# Patient Record
(demographics unavailable — no encounter records)

---

## 2020-11-11 NOTE — REP
INDICATION:

PAIN IN RIGHT ANKLE AND JOINTS OF RIGHT FOOT.



COMPARISON:

None.



TECHNIQUE:

Four views.



FINDINGS:

Four views of the right ankle demonstrate intact ankle mortise.  There is mild soft

tissue swelling about the lateral malleolus.  No fracture or subluxation is seen.



IMPRESSION:

Mild soft tissue swelling.  No fracture noted





<Electronically signed by Freeman Rosenbaum > 11/11/20 [id number]

## 2020-11-11 NOTE — REP
INDICATION:

PAIN IN RIGHT ANKLE AND JOINTS OF RIGHT FOOT.  Inversion injury.



COMPARISON:

None.



TECHNIQUE:

Four views.



FINDINGS:

Four views of the right foot demonstrate mild hallux valgus.  Bones, joints and soft

tissues are otherwise unremarkable..  No fracture or subluxation is seen.  No opaque

foreign body noted.





IMPRESSION:

Negative right foot series.







<Electronically signed by Freeman Rosenbaum > 11/11/20 [id number]

## 2021-02-12 NOTE — CCD
Summarization Of Episode

                             Created on: 2021



AFSHANPATSY COATS

External Reference #: [id number]

: 1979

Sex: Female



Demographics





                          Address                   [address] [place]  [address]

 

                          Home Phone                [phone]

 

                          Preferred Language        English

 

                          Marital Status            Single

 

                          Yazidi Affiliation     NONE

 

                          Race                      White

 

                          Ethnic Group              Not  or 





Author





                          Author                    [facility]

 

                          Organization              [facility]

 

                          Address                   Unknown

 

                          Phone                     Unavailable







Support





                Name            Relationship    Address         Phone

 

                    FUCCILLO*           Next Of Kin         [address]

[place]  [address]                        [phone]

 

                    [facility] [place] [employer] Next Of Kin         [address]

[place]  [address]                    [phone]

 

                CONTACT,  NO    Next Of Kin     Unknown         [phone]

 

                    [employer]        Next Of Kin         -

[place]  [address]                   [phone]

 

                    IN HOME CARE SENIOR Next Of Kin         -

[place]  [address]                    -

 

                UE              Next Of Kin     Unknown         Unavailable

 

                    SAMREEN GALLO      Next Of Kin         [address] [place]  [address]                       [phone]

 

                    [employer]        Next Of Kin         [address], [place]  [address] [phone]

 

                    DIPESH BULLARD    Next Of Kin         [address] [place], [place]  [address] [phone]

 

                    NOEMI Bullard   ECON                [address] [place]  [address]                     [phone] 







Care Team Providers





                    Care Team Member Name Role                Phone

 

                    FRANCIS Markham PA Unavailable         Unavailable

 

                    Scordo, M Liberty PA Unavailable         Unavailable

 

                    Scordo, M Liberty PA Unavailable         Unavailable

 

                    Scordo, M Liberty PA Unavailable         Unavailable

 

                    Scordo, M Liberty PA Unavailable         Unavailable

 

                    Scordo, M Liberty PA Unavailable         Unavailable

 

                    Scordo, M Liberty PA Unavailable         Unavailable

 

                    Scordo, M Liberty PA Unavailable         Unavailable

 

                    Scordo, M Liberty PA Unavailable         Unavailable

 

                    Scordo, M Liberty PA Unavailable         Unavailable

 

                    Scordo, M Liberty PA Unavailable         Unavailable

 

                    Scordo, M Liberty PA Unavailable         Unavailable

 

                    Scordo, M Liberty PA Unavailable         Unavailable

 

                    Scordo, M Liberty PA Unavailable         Unavailable

 

                    Scordo, M Liberty PA Unavailable         Unavailable

 

                    Scordo, M Liberty PA Unavailable         Unavailable

 

                    Scordo, M Liberty PA Unavailable         Unavailable

 

                    Scordo, M Liberty PA Unavailable         Unavailable

 

                    Scordo, M Liberty PA Unavailable         Unavailable

 

                    Scordo, M Liberty PA Unavailable         Unavailable

 

                    Scordo, M Liberty PA Unavailable         Unavailable

 

                    Scordo, M Liberty PA Unavailable         Unavailable

 

                    Scordo, M Liberty PA Unavailable         Unavailable

 

                    Scordo, M Liberty PA Unavailable         Unavailable

 

                    Scordo, M Liberty PA Unavailable         Unavailable

 

                    Scordo, M Liberty PA Unavailable         Unavailable

 

                    Scordo, M Liberty PA Unavailable         Unavailable

 

                    Scordo, M Liberty PA Unavailable         Unavailable

 

                    Scordo, M Liberty PA Unavailable         Unavailable

 

                    Scordo, M Liberty PA Unavailable         Unavailable

 

                    Scordo, M Liberty PA Unavailable         Unavailable

 

                    Scordo, M Liberty PA Unavailable         Unavailable

 

                    Scordo, M Liberty PA Unavailable         Unavailable

 

                    Scordo, M Liberty PA Unavailable         Unavailable

 

                    Scordo, M Liberty PA Unavailable         Unavailable

 

                    Scordo, M Liberty PA Unavailable         Unavailable

 

                    Scordo, M Liberty PA Unavailable         Unavailable

 

                    Scordo, M Liberty PA Unavailable         Unavailable

 

                    Scordo, M Liberty PA Unavailable         Unavailable

 

                    Scordo, M Liberty PA Unavailable         Unavailable

 

                    Scordo, M Liberty PA Unavailable         Unavailable

 

                    Scordo, M Liberty PA Unavailable         Unavailable

 

                    RING, K XENIA PA  Unavailable         Unavailable

 

                    RING, K XENIA PA  Unavailable         Unavailable

 

                    RING, K XENIA PA  Unavailable         Unavailable

 

                    RING, K XENIA PA  Unavailable         Unavailable

 

                    RING, K XENIA PA  Unavailable         Unavailable

 

                    RING, K XENIA PA  Unavailable         Unavailable

 

                    RING, K XENIA PA  Unavailable         Unavailable

 

                    RING, K XENIA PA  Unavailable         Unavailable

 

                    RING, K XENIA PA  Unavailable         Unavailable

 

                    RING, K XENIA PA  Unavailable         Unavailable

 

                    RING, K XENIA PA  Unavailable         Unavailable

 

                    RING, K XENIA PA  Unavailable         Unavailable

 

                    RING, K XENIA PA  Unavailable         Unavailable

 

                    RING, K XENIA PA  Unavailable         Unavailable

 

                    RING, K XENIA PA  Unavailable         Unavailable

 

                    RING, K XENIA PA  Unavailable         Unavailable

 

                    RING, K XENIA PA  Unavailable         Unavailable

 

                    RING, K XENIA PA  Unavailable         Unavailable

 

                    RING, K XENIA PA  Unavailable         Unavailable

 

                    RING, K XENIA PA  Unavailable         Unavailable

 

                    RING, K XENIA PA  Unavailable         Unavailable

 

                    Aguillon,  Crystal NP Unavailable         Unavailable

 

                    Aguillon,  Crystal NP Unavailable         Unavailable

 

                    Aguillon,  Crystal NP Unavailable         Unavailable

 

                    Aguillon,  Crystal NP Unavailable         Unavailable

 

                    Aguillon,  Crystal NP Unavailable         Unavailable

 

                    Aguillon,  Crystal NP Unavailable         Unavailable

 

                    Aguillon,  Crystal NP Unavailable         Unavailable

 

                    Aguillon,  Crystal NP Unavailable         Unavailable

 

                    Aguillon,  Rcystal NP Unavailable         Unavailable

 

                    Aguillon,  Crystal NP Unavailable         Unavailable

 

                    Aguillon,  Crystal NP Unavailable         Unavailable



                                  



Re-disclosure Warning

          The records that you are about to access may contain information from 
federally-assisted alcohol or drug abuse programs. If such information is 
present, then the following federally mandated warning applies: This information
has been disclosed to you from records protected by federal confidentiality 
rules (42 CFR part 2). The federal rules prohibit you from making any further 
disclosure of this information unless further disclosure is expressly permitted 
by the written consent of the person to whom it pertains or as otherwise 
permitted by 42 CFR part 2. A general authorization for the release of medical 
or other information is NOT sufficient for this purpose. The Federal rules 
restrict any use of the information to criminally investigate or prosecute any 
alcohol or drug abuse patient.The records that you are about to access may 
contain highly sensitive health information, the redisclosure of which is 
protected by Article 27-F of the [facility] Public Health law. If you 
continue you may have access to information: Regarding HIV / AIDS; Provided by 
facilities licensed or operated by the [facility] Office of Mental Health; 
or Provided by the [facility] Office for People With Developmental 
Disabilities. If such information is present, then the following [place] 
mandated warning applies: This information has been disclosed to you from 
confidential records which are protected by state law. State law prohibits you 
from making any further disclosure of this information without the specific 
written consent of the person to whom it pertains, or as otherwise permitted by 
law. Any unauthorized further disclosure in violation of state law may result in
a fine or residential sentence or both. A general authorization for the release of 
medical or other information is NOT sufficient authorization for further disc
losure.                                                                         
    



Family History

          



             Family Member Name Family Member Gender Family Member Status Date o

f Status 

Description                             Data Source(s)

 

           Unknown    Male       Problem                          [facility] ([facility]

 

           Unknown    Unknown    Problem                          [facility] ([facility]

 

                                        bone mgm 

 

           Unknown    Unknown    Problem                          [facility] ([facility]

 

           Unknown    Female                                       



                                                                                
                                     



Encounters

          



           Encounter  Providers  Location   Date       Indications Data Source(s

)

 

                Outpatient      Attender: Crystal espinal 10/28/2020 

10:00:00 AM EDT                                     [facility] ([facility]

e, [facility])

 

                Outpatient      Attender: XENIA [facility] 

2020 12:15:00 

PM EDT                                              [facility] ([facility]

e, [facility])

 

           Outpatient Attender: Liberty [facility] Main Office 2020 08:30:00

 AM EDT            

[facility] ([facility]

 

           Outpatient Attender: [facility] Main Office 2020 10:45:00

 AM EST            

[facility] ([facility]



                                                                                
                                     



Medications

          



          Medication Brand Name Start Date Product Form Dose      Route     Admi

nistrative 

Instructions Pharmacy Instructions Status     Indications Reaction   Description

 Data 

Source(s)

 

          20 mg               2021 12:00:00 AM EST tablet    90           

       TAKE ONE TABLET BY MOUTH THREE 

TIMES A DAY * MAXIMUM DAILY DOSE = 3    TAKE ONE TABLET BY MOUTH THREE TIMES A D

AY 

* MAXIMUM DAILY DOSE = 3 SOLD: 2021                                       

 [facility]

 

          20 mg               12/10/2020 12:00:00 AM EST tablet    90           

       TAKE ONE TABLET BY MOUTH THREE 

TIMES A DAY MAXIMUM DAILY DOSE = 3      TAKE ONE TABLET BY MOUTH THREE TIMES A D

AY 

MAXIMUM DAILY DOSE = 3 SOLD: 12/10/2020                                        K

[facility]

 

          20 mg               11/10/2020 12:00:00 AM EST tablet    90           

       TAKE ONE TABLET BY MOUTH THREE 

TIMES A DAY MAXIMUM DAILY DOSE = 3      TAKE ONE TABLET BY MOUTH THREE TIMES A D

AY 

MAXIMUM DAILY DOSE = 3 SOLD: 11/10/2020                                        K

[facility]

 

          20 mg               10/10/2020 12:00:00 AM EDT tablet    90           

       TAKE ONE TABLET BY MOUTH THREE 

TIMES A DAY MAXIMUM DAILY DOSE = 3      TAKE ONE TABLET BY MOUTH THREE TIMES A D

AY 

MAXIMUM DAILY DOSE = 3 SOLD: 10/10/2020                                        K

[facility]

 

          20 mg               2020 12:00:00 AM EDT tablet    90           

       TAKE ONE TABLET BY MOUTH THREE 

TIMES A DAY MAXIMUM DAILY DOSE = 3      TAKE ONE TABLET BY MOUTH THREE TIMES A D

AY 

MAXIMUM DAILY DOSE = 3 SOLD: 2020                                        K

[facility]

 

          20 mg               2020 12:00:00 AM EDT tablet    90           

       TAKE ONE TABLET BY MOUTH THREE 

TIMES A DAY MAXIMUM DAILY DOSE = 3      TAKE ONE TABLET BY MOUTH THREE TIMES A D

AY 

MAXIMUM DAILY DOSE = 3 SOLD: 2020                                        K

[facility]

 

                          Acetaminophen 325 MG / Hydrocodone Bitartrate 5 MG Ora

l Tablet 

Hydrocodone-Acetaminophen 2020 12:00:00 AM EDT               ORAL         

        completed               

                                        [facility] ([facility])

 

     No Active Medications      2020 12:00:00 AM EDT                      

    completed                

[facility] ([facility])

 

          20 mg               2020 12:00:00 AM EDT tablet    90           

       TAKE ONE TABLET BY MOUTH THREE 

TIMES A DAY MAXIMUM DAILY DOSE = 3      TAKE ONE TABLET BY MOUTH THREE TIMES A D

AY 

MAXIMUM DAILY DOSE = 3 SOLD: 2020                                        K

[facility]

 

          20 mg               2020 12:00:00 AM EDT tablet    90           

       TAKE ONE TABLET BY MOUTH THREE 

TIMES A DAY MAXIMUM DAILY DOSE = 3      TAKE ONE TABLET BY MOUTH THREE TIMES A D

AY 

MAXIMUM DAILY DOSE = 3 SOLD: 2020                                        K

[facility]

 

          400 mg              2020 12:00:00 AM EDT capsule   180          

       TAKE ONE CAPSULE BY MOUTH 

TWICE A DAY TAKE ONE CAPSULE BY MOUTH TWICE A DAY SOLD: 2020              

                    [facility]

 

          20 mg               2020 12:00:00 AM EDT tablet    90           

       TAKE ONE TABLET BY MOUTH THREE 

TIMES A DAY MAXIMUM DAILY DOSE = 3      TAKE ONE TABLET BY MOUTH THREE TIMES A D

AY 

MAXIMUM DAILY DOSE = 3 SOLD: 2020                                        [facility]

[facility]

 

          20 mg               2020 12:00:00 AM EDT tablet    90           

       TAKE 1 TABLET BY MOUTH THREE 

TIMES A DAY MAXIMUM DAILY DOSE = 3      TAKE 1 TABLET BY MOUTH THREE TIMES A DAY

 

MAXIMUM DAILY DOSE = 3 SOLD: 2020                                        [facility]

[facility]

 

          400 mg              2020 12:00:00 AM EDT capsule   60           

       TAKE 1 CAPSULE BY MOUTH ONCE 

DAILY      TAKE 1 CAPSULE BY MOUTH ONCE DAILY SOLD: 2020                  

                [facility]

 

          400 mg              2020 12:00:00 AM EDT capsule   60           

       TAKE ONE CAPSULE BY MOUTH TWICE

A DAY      TAKE ONE CAPSULE BY MOUTH TWICE A DAY SOLD: 2020               

                   [facility]

 

          20 mg               2020 12:00:00 AM EDT tablet    90           

       TAKE ONE TABLET BY MOUTH THREE 

TIMES A DAY MAXIMUM DAILY DOSE = 3      TAKE ONE TABLET BY MOUTH THREE TIMES A D

AY 

MAXIMUM DAILY DOSE = 3 SOLD: 2020                                        [facility]

[facility]

 

        gabapentin 600 MG Oral Tablet Gabapentin 2020 12:00:00 AM EST     

            ORAL             

             active                                              [facility] ([facility]

 

                                        Amphetamine aspartate 5 MG / Amphetamine

 Sulfate 5 MG / Dextroamphetamine 

saccharate 5 MG / Dextroamphetamine Sulfate 5 MG Oral Tablet 

Amphetamine-Dextroamphetamine 2020 12:00:00 AM EST                        

       active                   

[facility] ([facility]

 

          7.5-325 mg           2020 12:00:00 AM EST tablet    120         

        TAKE ONE TABLET BY MOUTH 

EVERY 6 HOURS AS NEEDED FOR PAIN, MAXIMUM DAILY DOSE = 4 TAKE ONE TABLET BY 

MOUTH EVERY 6 HOURS AS NEEDED FOR PAIN, MAXIMUM DAILY DOSE = 4 SOLD: 2020 

         

                                                            [facility]

 

          600 mg              2020 12:00:00 AM EST tablet    60           

       TAKE ONE TABLET BY MOUTH TWICE A

DAY        TAKE ONE TABLET BY MOUTH TWICE A DAY SOLD: 2020                

                  [facility]

 

          20 mg               2020 12:00:00 AM EST tablet    90           

       TAKE 1 TABLET BY MOUTH THREE 

TIMES A DAY MAXIMUM DAILY DOSE = 3      TAKE 1 TABLET BY MOUTH THREE TIMES A DAY

 

MAXIMUM DAILY DOSE = 3 SOLD: 2020                                        [facility]

[facility]

 

          7.5-325 mg           2020 12:00:00 AM EST tablet    120         

        TAKE ONE TABLET BY MOUTH 

EVERY 6 HOURS AS NEEDED FOR PAIN MAXIMUM DAILY DOSE = 4 TAKE ONE TABLET BY MOUTH

EVERY 6 HOURS AS NEEDED FOR PAIN MAXIMUM DAILY DOSE = 4 SOLD: 2020        

                                

[facility]

 

          20 mg               2019 12:00:00 AM EST tablet    90           

       TAKE ONE TABLET BY MOUTH THREE 

TIMES A DAY, MAXIMUM DAILY DOSE = 3     TAKE ONE TABLET BY MOUTH THREE TIMES A D

AY, 

MAXIMUM DAILY DOSE = 3 SOLD: 2019                                        [facility]

[facility]



                                                                                
                                                                                
                                                                                
                                             



Insurance Providers

          



             Payer name   Policy type / Coverage type Policy ID    Covered party

 ID Covered 

party's relationship to coronel Policy Coronel             Plan Information

 

          [employer] [facility] HEATHER DIV           [id number]           LP2           

      [id number]

 

          [employer]           [id number]           LP2                 [id number]

[id number]

 

          [facility] O         [id number]           S                   [id number]

[id number]

 

          [place] Plan P         [id number]           S                   [id number]

[id number]

 

          [facility] Commercial [id number]                            

   [id number]

 

          [employer] EMPIRE           [id number]           LP                  [id number]

[id number]

 

          [employer]           [id number]           LP                  [id number]

[id number]

 

          Emp/[employer] Commercial [id number]                            

   [id number]

 

          Emp/[employer] Commercial [id number]                            

   [id number]

 

          EMPIRE PLAN [facility] U         [id number]           Spouse              [id number]

[id number]

 

          Emp/[employer] Commercial [id number]                            

   [id number]

 

          EMPIRE BLUE CROSS BLUE SHIELD      -O/P           [id number]        

   [id number]                  [id number]

 

          [employer] EMPIRE           [id number]           LP                  [id number]

5

 

          [employer] Other     0                   Family Dependent Dipesh Bullard 0

 

          [place]/[employer] Commercial [id number]                         

      [id number]

 

          [employer] EMPIRE HEATHER DIV           [id number]           LP2           

      [id number]

 

          [employer] [place] Commercial [id number]                         

      [id number]

 

          [employer]           [id number]           LP2                 [id number]

[id number]

 

          [employer] COMMUNITY PLAN MCDHMO           [id number]           SP           

       [id number]

 

             [employer] [place] Health Maintenance Organization (HMO) [id number]

[id number]                 

Sponsored Dependent                                 [id number]

 

          [place] Commercial [id number]                         

      [id number]

 

          [place] Commercial [id number]                         

      [id number]

 

          [facility]       I         [id number]           Self                [id number]

 

          [employer](MCAID) O         [id number]           S              

     [id number]

 

          [employer] COMMUNITY PLAN MCDHMO           [id number]           SP           

       [id number]

 

          HC COMMUNITY PLAN MCDHMO           [id number]           SP           

       [id number]

 

          [employer] COMMUNITY PLAN MCDHMO           [id number]           SP           

       [id number]

 

          [facility] MEDICAID           [id number]           [place]                 [id number]

[id number]

 

          [facility]       I         [id number]           Self                [id number]

 

          [facility] Community Plan Health Maintenance Organization (HMO)              

       Self                 

 

          [employer] Hmo Commercial                     Self             

    

 

          [employer](MCAID) P         [id number]           S              

     [id number]

 

          HMO BLUE            [id number]           SP                  [id number]

[id number]

 

          MEDICAID            [id number]            SP                  [id number]

 

          [employer]           [id number]           SP                  [id number]

[id number]

 

          EMPIRE (STATE EMP) P         [id number]           S                   [id number]

[id number]

 

          EMPIRE (STATE EMP) P         UNAVAILABLE           S                  

 UNAVAILABLE

 

          [employer] P         [id number]           S                   [id number]

[id number]

 

                              [id number]                                [id number]



                                                                                
                                                                                
                                                                                
                                                                                
                                                                                
                                                                         



Surgeries/Procedures

          



             Procedure    Description  Date         Indications  Data Source(s)

 

             INCISION & DRAINAGE ABSCESS SIMPLE/SINGLE              2020 1

2:00:00 AM EDT              [facility] 

([facility])



                                                                                
       



Social History

          



           Code       Duration   Value      Status     Description Data Source(s

)

 

           Smoking    10/28/2020 12:00:00 AM EDT Quit       completed  Quit     

  [facility] ([facility])



                                                                                
                 



Vital Signs

          



                    ID                  Date                Data Source

 

                    UNK                                      









           Name       Value      Range      Interpretation Code Description Data

 Source(s)

 

           Body mass index (BMI) [Ratio] 27.5 kg/m2                       27.5 k

g/m2 [facility] ([facility], [facility])

 

           Body height 64 [in_i]                        64 [in_i]  [facility] ([facility], [facility])

 

                                        5'4" 

 

           Body weight 160.00 [lb_av]                       160.00 [lb_av] MEDEN

T ([facility], 

[facility])

 

           Body temperature 97.6 [degF]                       97.6 [degF] [facility]

 ([facility], 

[facility])

 

           Oxygen saturation in Arterial blood by Pulse oximetry 97 %           

                  97 %       [facility] 

([facility], [facility])

 

           Respiratory rate 18 /min                          18 /min    [facility] (

[facility], [facility])

 

           Heart rate 76 /min                          76 /min    [facility] ([facility], [facility])

 

           Diastolic blood pressure 66 mm[Hg]                        66 mm[Hg]  

[facility] ([facility], 

[facility])

 

           Systolic blood pressure 96 mm[Hg]                        96 mm[Hg]  M

ED[facility] ([facility], 

[facility])

 

           Body mass index (BMI) [Ratio] 27.5 kg/m2                       27.5 k

g/m2 [facility] ([facility], [facility])

 

           Body height 64 [in_i]                        64 [in_i]  [facility] ([facility], [facility])

 

                                        5'4" 

 

           Body weight 160.00 [lb_av]                       160.00 [lb_av] MEDEN

T ([facility], 

[facility])

 

           Body temperature 98.2 [degF]                       98.2 [degF] [facility]

 ([facility], 

[facility])

 

           Oxygen saturation in Arterial blood by Pulse oximetry 98 %           

                  98 %       [facility] 

([facility], [facility])

 

           Respiratory rate 16 /min                          16 /min    [facility] (

[facility], [facility])

 

           Heart rate 84 /min                          84 /min    [facility] ([facility], [facility])

 

           Diastolic blood pressure 73 mm[Hg]                        73 mm[Hg]  

[facility] ([facility])

 

           Systolic blood pressure 107 mm[Hg]                       107 mm[Hg] M

ED[facility] ([facility])

 

           Body mass index (BMI) [Ratio] 26.5 kg/m2                       26.5 k

g/m2 [facility] ([facility]

 

           Oxygen saturation in Arterial blood by Pulse oximetry 97 %           

                  97 %       [facility] 

([facility]

 

           Body weight 148.25 [lb_av]                       148.25 [lb_av] [facility]

T ([facility]

 

           Body height 62.75 [in_i]                       62.75 [in_i] [facility] ([facility]

 

                                        5'2.75" 

 

           Respiratory rate 14 /min                          14 /min    [facility] (

[facility]

 

           Body temperature 98.0 [degF]                       98.0 [degF] [facility]

 ([facility]

 

           Heart rate 94 /min                          94 /min    [facility] ([facility]

 

           Diastolic blood pressure 63 mm[Hg]                        63 mm[Hg]  

[facility] ([facility]

 

           Systolic blood pressure 104 mm[Hg]                       104 mm[Hg] M

ED[facility] ([facility]

## 2021-02-12 NOTE — CCD
Summarization Of Episode

                             Created on: 2021



AFSHANPATSY COATS

External Reference #: [id number]

: 1979

Sex: Female



Demographics





                          Address                   [address] [place]  [address]

 

                          Home Phone                [phone]

 

                          Preferred Language        English

 

                          Marital Status            Single

 

                          Confucianism Affiliation     NONE

 

                          Race                      White

 

                          Ethnic Group              Not  or 





Author





                          Author                    [facility]

 

                          Organization              [facility]

 

                          Address                   Unknown

 

                          Phone                     Unavailable







Support





                Name            Relationship    Address         Phone

 

                    [employer] Next Of Kin         [address]

[place]  [address]                    [phone]

 

                CONTACT,  NO    Next Of Kin     Unknown         [phone]

 

                    PRIVATE DUTY        Next Of Kin         -

[place]  [address]                   [phone]

 

                    [facility] HOME [facility]ARE SENIOR Next Of Kin         -

[place]  [address]                    -

 

                UE              Next Of Kin     Unknown         Unavailable

 

                    SAMREEN GALLO      Next Of Kin         [address] [place]  [address]                       [id number]

 

                    [employer]        Next Of Kin         [address], [place]  [address] [phone]

 

                    DIPESH BULLARD    Next Of Kin         [address] [place], [place]  [address] [phone]

 

                    NOEMI Bullard   ECON                [address] [place]  [address]                     [phone] 







Care Team Providers





                    Care Team Member Name Role                Phone

 

                    FRANICS Markham PA Unavailable         Unavailable

 

                    Scordo, M Liberty PA Unavailable         Unavailable

 

                    Scordo, M Liberty PA Unavailable         Unavailable

 

                    Scordo, M Liberty PA Unavailable         Unavailable

 

                    Scordo, M Liberty PA Unavailable         Unavailable

 

                    Scordo, M Liberty PA Unavailable         Unavailable

 

                    Scordo, M Liberty PA Unavailable         Unavailable

 

                    Scordo, M Liberty PA Unavailable         Unavailable

 

                    Scordo, M Liberty PA Unavailable         Unavailable

 

                    Scordo, M Liberty PA Unavailable         Unavailable

 

                    Scordo, M Liberty PA Unavailable         Unavailable

 

                    Scordo, M Liberty PA Unavailable         Unavailable

 

                    Scordo, M Liberty PA Unavailable         Unavailable

 

                    Scordo, M Liberty PA Unavailable         Unavailable

 

                    Scordo, M Liberty PA Unavailable         Unavailable

 

                    Scordo, M Liberty PA Unavailable         Unavailable

 

                    Scordo, M Liberty PA Unavailable         Unavailable

 

                    Scordo, M Liberty PA Unavailable         Unavailable

 

                    Scordo, M Liberty PA Unavailable         Unavailable

 

                    Scordo, M Liberty PA Unavailable         Unavailable

 

                    Scordo, M Liberty PA Unavailable         Unavailable

 

                    Scordo, M Liberty PA Unavailable         Unavailable

 

                    Scordo, M Liberty PA Unavailable         Unavailable

 

                    Scordo, M Liberty PA Unavailable         Unavailable

 

                    Scordo, M Liberty PA Unavailable         Unavailable

 

                    Scordo, M Liberty PA Unavailable         Unavailable

 

                    Scordo, M Liberty PA Unavailable         Unavailable

 

                    Scordo, M Liberty PA Unavailable         Unavailable

 

                    Scordo, M Liberty PA Unavailable         Unavailable

 

                    Scordo, M Librety PA Unavailable         Unavailable

 

                    Scordo, M Liberty PA Unavailable         Unavailable

 

                    Scordo, M Liberty PA Unavailable         Unavailable

 

                    Scordo, M Liberty PA Unavailable         Unavailable

 

                    Scordo, M Liberty PA Unavailable         Unavailable

 

                    Scordo, M Liberty PA Unavailable         Unavailable

 

                    Scordo, M Liberty PA Unavailable         Unavailable

 

                    Scordo, M Liberty PA Unavailable         Unavailable

 

                    Scordo, M Liberty PA Unavailable         Unavailable

 

                    Scordo, M Liberty PA Unavailable         Unavailable

 

                    Scordo, M Liberty PA Unavailable         Unavailable

 

                    Scordo, M Liberty PA Unavailable         Unavailable

 

                    Scordo, M Liberty PA Unavailable         Unavailable

 

                    RING, K XENIA PA  Unavailable         Unavailable

 

                    RING, K XENIA PA  Unavailable         Unavailable

 

                    RING, K XENIA PA  Unavailable         Unavailable

 

                    RING, K XENIA PA  Unavailable         Unavailable

 

                    RING, K XENIA PA  Unavailable         Unavailable

 

                    RING, K XENIA PA  Unavailable         Unavailable

 

                    RING, K XENIA PA  Unavailable         Unavailable

 

                    RING, K XENIA PA  Unavailable         Unavailable

 

                    RING, K XENIA PA  Unavailable         Unavailable

 

                    RING, K XENIA PA  Unavailable         Unavailable

 

                    RING, K XENIA PA  Unavailable         Unavailable

 

                    RING, K XENIA PA  Unavailable         Unavailable

 

                    RING, K XENIA PA  Unavailable         Unavailable

 

                    RING, K XENIA PA  Unavailable         Unavailable

 

                    RING, K XENIA PA  Unavailable         Unavailable

 

                    RING, K XENIA PA  Unavailable         Unavailable

 

                    RING, K XENIA PA  Unavailable         Unavailable

 

                    RING, K XENIA PA  Unavailable         Unavailable

 

                    RING, K XENIA PA  Unavailable         Unavailable

 

                    RING, K XENIA PA  Unavailable         Unavailable

 

                    RING, K XENIA PA  Unavailable         Unavailable

 

                    Aguillon,  Crystal NP Unavailable         Unavailable

 

                    Aguillon,  Crystal NP Unavailable         Unavailable

 

                    Aguillon,  Crystal NP Unavailable         Unavailable

 

                    Aguillon,  Crystal NP Unavailable         Unavailable

 

                    Aguillon,  Crystal NP Unavailable         Unavailable

 

                    Aguillon,  Crystal NP Unavailable         Unavailable

 

                    Aguillon,  Crystal NP Unavailable         Unavailable

 

                    Aguillon,  Crystal NP Unavailable         Unavailable

 

                    Aguillon,  Crystal NP Unavailable         Unavailable

 

                    Aguillon,  Crystal NP Unavailable         Unavailable

 

                    Aguillon,  Crystal NP Unavailable         Unavailable



                                  



Re-disclosure Warning

          The records that you are about to access may contain information from 
federally-assisted alcohol or drug abuse programs. If such information is 
present, then the following federally mandated warning applies: This information
has been disclosed to you from records protected by federal confidentiality 
rules (42 CFR part 2). The federal rules prohibit you from making any further 
disclosure of this information unless further disclosure is expressly permitted 
by the written consent of the person to whom it pertains or as otherwise 
permitted by 42 CFR part 2. A general authorization for the release of medical 
or other information is NOT sufficient for this purpose. The Federal rules 
restrict any use of the information to criminally investigate or prosecute any 
alcohol or drug abuse patient.The records that you are about to access may 
contain highly sensitive health information, the redisclosure of which is 
protected by Article 27-F of the [facility] Public Health law. If you 
continue you may have access to information: Regarding HIV / AIDS; Provided by 
facilities licensed or operated by the [facility] Office of Mental Health; 
or Provided by the [facility] Office for People With Developmental 
Disabilities. If such information is present, then the following [place] 
mandated warning applies: This information has been disclosed to you from 
confidential records which are protected by state law. State law prohibits you 
from making any further disclosure of this information without the specific 
written consent of the person to whom it pertains, or as otherwise permitted by 
law. Any unauthorized further disclosure in violation of state law may result in
a fine or intermediate sentence or both. A general authorization for the release of 
medical or other information is NOT sufficient authorization for further disc
losure.                                                                         
    



Family History

          



             Family Member Name Family Member Gender Family Member Status Date o

f Status 

Description                             Data Source(s)

 

           Unknown    Male       Problem                          [facility] ([facility]

 

           Unknown    Unknown    Problem                          [facility] ([facility]

 

                                        bone mgm 

 

           Unknown    Unknown    Problem                          [facility] ([facility] )

 

           Unknown    Female                                       



                                                                                
                                     



Encounters

          



           Encounter  Providers  Location   Date       Indications Data Source(s

)

 

                Outpatient      Attender: Crystal Cummings

teddy 10/28/2020 

10:00:00 AM EDT                                     [facility] ([facility]

 

                Outpatient      Attender: XENIA Alanis Primary 

2020 12:15:00 

PM EDT                                              [facility] ([facility]

[facility], [facility])

 

           Outpatient Attender: Liberty COMBS Main Office 2020 08:30:00

 AM EDT            

[facility] ([facility]

 

           Outpatient Attender: [facility] Main Office 2020 10:45:00

 AM EST            

[facility] ([facility]



                                                                                
                                     



Medications

          



          Medication Brand Name Start Date Product Form Dose      Route     Admi

nistrative 

Instructions Pharmacy Instructions Status     Indications Reaction   Description

 Data 

Source(s)

 

          20 mg               2021 12:00:00 AM EST tablet    90           

       TAKE ONE TABLET BY MOUTH THREE 

TIMES A DAY * MAXIMUM DAILY DOSE = 3    TAKE ONE TABLET BY MOUTH THREE TIMES A D

AY 

* MAXIMUM DAILY DOSE = 3 SOLD: 2021                                       

 [facility]

 

          20 mg               12/10/2020 12:00:00 AM EST tablet    90           

       TAKE ONE TABLET BY MOUTH THREE 

TIMES A DAY MAXIMUM DAILY DOSE = 3      TAKE ONE TABLET BY MOUTH THREE TIMES A D

AY 

MAXIMUM DAILY DOSE = 3 SOLD: 12/10/2020                                        [facility]

[facility]

 

          20 mg               11/10/2020 12:00:00 AM EST tablet    90           

       TAKE ONE TABLET BY MOUTH THREE 

TIMES A DAY MAXIMUM DAILY DOSE = 3      TAKE ONE TABLET BY MOUTH THREE TIMES A D

AY 

MAXIMUM DAILY DOSE = 3 SOLD: 11/10/2020                                        K

[facility]

 

          20 mg               10/10/2020 12:00:00 AM EDT tablet    90           

       TAKE ONE TABLET BY MOUTH THREE 

TIMES A DAY MAXIMUM DAILY DOSE = 3      TAKE ONE TABLET BY MOUTH THREE TIMES A D

AY 

MAXIMUM DAILY DOSE = 3 SOLD: 10/10/2020                                        K

[facility]

 

          20 mg               2020 12:00:00 AM EDT tablet    90           

       TAKE ONE TABLET BY MOUTH THREE 

TIMES A DAY MAXIMUM DAILY DOSE = 3      TAKE ONE TABLET BY MOUTH THREE TIMES A D

AY 

MAXIMUM DAILY DOSE = 3 SOLD: 2020                                        K

[facility]

 

          20 mg               2020 12:00:00 AM EDT tablet    90           

       TAKE ONE TABLET BY MOUTH THREE 

TIMES A DAY MAXIMUM DAILY DOSE = 3      TAKE ONE TABLET BY MOUTH THREE TIMES A D

AY 

MAXIMUM DAILY DOSE = 3 SOLD: 2020                                        K

[facility]

 

                          Acetaminophen 325 MG / Hydrocodone Bitartrate 5 MG Ora

l Tablet 

Hydrocodone-Acetaminophen 2020 12:00:00 AM EDT               ORAL         

        completed               

                                        [facility] ([facility])

 

     No Active Medications      2020 12:00:00 AM EDT                      

    completed                

[facility] ([facility])

 

          20 mg               2020 12:00:00 AM EDT tablet    90           

       TAKE ONE TABLET BY MOUTH THREE 

TIMES A DAY MAXIMUM DAILY DOSE = 3      TAKE ONE TABLET BY MOUTH THREE TIMES A D

AY 

MAXIMUM DAILY DOSE = 3 SOLD: 2020                                        K

[facility]

 

          20 mg               2020 12:00:00 AM EDT tablet    90           

       TAKE ONE TABLET BY MOUTH THREE 

TIMES A DAY MAXIMUM DAILY DOSE = 3      TAKE ONE TABLET BY MOUTH THREE TIMES A D

AY 

MAXIMUM DAILY DOSE = 3 SOLD: 2020                                        K

[facility]

 

          400 mg              2020 12:00:00 AM EDT capsule   180          

       TAKE ONE CAPSULE BY MOUTH 

TWICE A DAY TAKE ONE CAPSULE BY MOUTH TWICE A DAY SOLD: 2020              

                    [facility]

 

          20 mg               2020 12:00:00 AM EDT tablet    90           

       TAKE ONE TABLET BY MOUTH THREE 

TIMES A DAY MAXIMUM DAILY DOSE = 3      TAKE ONE TABLET BY MOUTH THREE TIMES A D

AY 

MAXIMUM DAILY DOSE = 3 SOLD: 2020                                        K

[facility]

 

          20 mg               2020 12:00:00 AM EDT tablet    90           

       TAKE 1 TABLET BY MOUTH THREE 

TIMES A DAY MAXIMUM DAILY DOSE = 3      TAKE 1 TABLET BY MOUTH THREE TIMES A DAY

 

MAXIMUM DAILY DOSE = 3 SOLD: 2020                                        [facility]

[facility]

 

          400 mg              2020 12:00:00 AM EDT capsule   60           

       TAKE 1 CAPSULE BY MOUTH ONCE 

DAILY      TAKE 1 CAPSULE BY MOUTH ONCE DAILY SOLD: 2020                  

                [facility]

 

          400 mg              2020 12:00:00 AM EDT capsule   60           

       TAKE ONE CAPSULE BY MOUTH TWICE

A DAY      TAKE ONE CAPSULE BY MOUTH TWICE A DAY SOLD: 2020               

                   [facility]

 

          20 mg               2020 12:00:00 AM EDT tablet    90           

       TAKE ONE TABLET BY MOUTH THREE 

TIMES A DAY MAXIMUM DAILY DOSE = 3      TAKE ONE TABLET BY MOUTH THREE TIMES A D

AY 

MAXIMUM DAILY DOSE = 3 SOLD: 2020                                        [facility]

[facility]

 

        gabapentin 600 MG Oral Tablet Gabapentin 2020 12:00:00 AM EST     

            ORAL             

             active                                              [facility] ([facility]

 

                                        Amphetamine aspartate 5 MG / Amphetamine

 Sulfate 5 MG / Dextroamphetamine 

saccharate 5 MG / Dextroamphetamine Sulfate 5 MG Oral Tablet 

Amphetamine-Dextroamphetamine 2020 12:00:00 AM EST                        

       active                   

[facility] ([facility]

 

          7.5-325 mg           2020 12:00:00 AM EST tablet    120         

        TAKE ONE TABLET BY MOUTH 

EVERY 6 HOURS AS NEEDED FOR PAIN, MAXIMUM DAILY DOSE = 4 TAKE ONE TABLET BY 

MOUTH EVERY 6 HOURS AS NEEDED FOR PAIN, MAXIMUM DAILY DOSE = 4 SOLD: 2020 

         

                                                            [facility]

 

          600 mg              2020 12:00:00 AM EST tablet    60           

       TAKE ONE TABLET BY MOUTH TWICE A

DAY        TAKE ONE TABLET BY MOUTH TWICE A DAY SOLD: 2020                

                  [facility]

 

          20 mg               2020 12:00:00 AM EST tablet    90           

       TAKE 1 TABLET BY MOUTH THREE 

TIMES A DAY MAXIMUM DAILY DOSE = 3      TAKE 1 TABLET BY MOUTH THREE TIMES A DAY

 

MAXIMUM DAILY DOSE = 3 SOLD: 2020                                        [facility]

[facility]

 

          7.5-325 mg           2020 12:00:00 AM EST tablet    120         

        TAKE ONE TABLET BY MOUTH 

EVERY 6 HOURS AS NEEDED FOR PAIN MAXIMUM DAILY DOSE = 4 TAKE ONE TABLET BY MOUTH

EVERY 6 HOURS AS NEEDED FOR PAIN MAXIMUM DAILY DOSE = 4 SOLD: 2020        

                                

[facility]

 

          20 mg               2019 12:00:00 AM EST tablet    90           

       TAKE ONE TABLET BY MOUTH THREE 

TIMES A DAY, MAXIMUM DAILY DOSE = 3     TAKE ONE TABLET BY MOUTH THREE TIMES A D

AY, 

MAXIMUM DAILY DOSE = 3 SOLD: 2019                                        [facility]

[facility]



                                                                                
                                                                                
                                                                                
                                             



Insurance Providers

          



             Payer name   Policy type / Coverage type Policy ID    Covered party

 ID Covered 

party's relationship to coronel Policy Coronel             Plan Information

 

          [facility]           [id number]           LP2           

      [id number]

 

          [employer]           [id number]           LP2                 [id number]

[id number]

 

          [employer] O         [id number]           S                   [id number]

[id number]

 

          [place] Plan P         [id number]           S                   [id number]

[id number]

 

          Emp/[employer] Commercial [id number]                            

   [id number]

 

          BCBS EMPIRE           [id number]           LP                  [id number]

[id number]

 

          [employer]           [id number]           LP                  [id number]

[id number]

 

          Emp/[employer] Commercial [id number]                            

   [id number]

 

          Emp/[employer] Commercial [id number]                            

   [id number]

 

          EMPIRE PLAN [facility] U         [id number]           Spouse              [id number]

[id number]

 

          Emp/[employer] Commercial [id number]                            

   [id number]

 

          EMPIRE BLUE CROSS BLUE SHIELD      -O/P           [id number]        

   [id number]                  [id number]

 

          [employer]BS EMPIRE           [id number]           LP                  [id number]

5

 

          [employer] Other     0                   Family Dependent Dipesh Bullard 0

 

          [place] [id number]                         

      [id number]

 

          [employer] EMPIRE HEATHER DIV           [id number]           LP2           

      [id number]

 

          [employer] [place] Commercial [id number]                         

      [id number]

 

          [employer]           [id number]           LP2                 [id number]

[id number]

 

          [employer] COMMUNITY PLAN MCDHMO           [id number]           SP           

       [id number]

 

             [employer] [place] Health Maintenance Organization (HMO) [id number]

[id number]                 

Sponsored Dependent                                 [id number]

 

          [place] [id number]                         

      [id number]

 

          [place] [id number]                         

      [id number]

 

          [facility]       I         [id number]           Self                [id number]

 

          [employer](MCAID) O         [id number]           S              

     [id number]

 

          [employer] COMMUNITY PLAN MCDHMO           [id number]           SP           

       [id number]

 

          HC COMMUNITY PLAN MCDHMO           [id number]           SP           

       [id number]

 

          [employer] COMMUNITY PLAN MCDHMO           [id number]           SP           

       [id number]

 

          [facility] MEDICAID           [id number]           [place]                 [id number]

[id number]

 

          [facility]       I         [id number]           Self                [id number]

 

          [facility] Community Plan Health Maintenance Organization (HMO)              

       Self                 

 

          [employer] Hmo Commercial                     Self             

    

 

          UNITED [employer]ARE(MCAID) P         [id number]           S              

     [id number]

 

          HMO BLUE            [id number]           SP                  [id number]

[id number]

 

          MEDICAID            [id number]            SP                  [id number]

 

          [employer]ARE           [id number]           SP                  [id number]

[id number]

 

          EMPIRE (STATE EMP) P         [id number]           S                   [id number]

[id number]

 

          EMPIRE (STATE EMP) P         UNAVAILABLE           S                  

 UNAVAILABLE

 

          [employer] BCBS P         [id number]           S                   [id number]

[id number]

 

                              [id number]                                [id number]



                                                                                
                                                                                
                                                                                
                                                                                
                                                                                
                                                                         



Surgeries/Procedures

          



             Procedure    Description  Date         Indications  Data Source(s)

 

             INCISION & DRAINAGE ABSCESS SIMPLE/SINGLE              2020 1

2:00:00 AM EDT              [facility] 

([facility])



                                                                                
       



Social History

          



           Code       Duration   Value      Status     Description Data Source(s

)

 

           Smoking    10/28/2020 12:00:00 AM EDT Quit       completed  Quit     

  [facility] ([facility])



                                                                                
                 



Vital Signs

          



                    ID                  Date                Data Source

 

                    UNK                                      









           Name       Value      Range      Interpretation Code Description Data

 Source(s)

 

           Body mass index (BMI) [Ratio] 27.5 kg/m2                       27.5 k

g/m2 MEDENT ([facility], [facility])

 

           Body height 64 [in_i]                        64 [in_i]  MEDENT ([facility], [facility])

 

                                        5'4" 

 

           Body weight 160.00 [lb_av]                       160.00 [lb_av] MEDEN

T ([facility], 

[facility])

 

           Body temperature 97.6 [degF]                       97.6 [degF] MEDENT

 ([facility], 

[facility])

 

           Oxygen saturation in Arterial blood by Pulse oximetry 97 %           

                  97 %       MED[facility] 

([facility], [facility])

 

           Respiratory rate 18 /min                          18 /min    [facility] (

[facility], [facility])

 

           Heart rate 76 /min                          76 /min    [facility] ([facility]

[facility] [place], [facility])

 

           Diastolic blood pressure 66 mm[Hg]                        66 mm[Hg]  

MED[facility] ([facility] [place], 

[facility])

 

           Systolic blood pressure 96 mm[Hg]                        96 mm[Hg]  M

EDENT ([facility], 

[facility])

 

           Body mass index (BMI) [Ratio] 27.5 kg/m2                       27.5 k

g/m2 MED[facility] ([facility], [facility])

 

           Body height 64 [in_i]                        64 [in_i]  MEDENT ([facility], [facility])

 

                                        5'4" 

 

           Body weight 160.00 [lb_av]                       160.00 [lb_av] MEDEN

T ([facility], 

[facility])

 

           Body temperature 98.2 [degF]                       98.2 [degF] MEDENT

 ([facility], 

[facility])

 

           Oxygen saturation in Arterial blood by Pulse oximetry 98 %           

                  98 %       MEDENT 

([facility] [place], [facility])

 

           Respiratory rate 16 /min                          16 /min    MED[facility] (

[facility] [place], [facility])

 

           Heart rate 84 /min                          84 /min    [facility] ([facility]

[facility], [facility])

 

           Diastolic blood pressure 73 mm[Hg]                        73 mm[Hg]  

MEDENT ([facility])

 

           Systolic blood pressure 107 mm[Hg]                       107 mm[Hg] M

EDENT ([facility])

 

           Body mass index (BMI) [Ratio] 26.5 kg/m2                       26.5 k

g/m2 [facility] ([facility]

 

           Oxygen saturation in Arterial blood by Pulse oximetry 97 %           

                  97 %       [facility] 

([facility]

 

           Body weight 148.25 [lb_av]                       148.25 [lb_av] [facility]

T ([facility]

 

           Body height 62.75 [in_i]                       62.75 [in_i] [facility] ([facility]

 

                                        5'2.75" 

 

           Respiratory rate 14 /min                          14 /min    [facility] (

[facility]

 

           Body temperature 98.0 [degF]                       98.0 [degF] [facility]

 ([facility]

 

           Heart rate 94 /min                          94 /min    [facility] ([facility]

 

           Diastolic blood pressure 63 mm[Hg]                        63 mm[Hg]  

[facility] ([facility]

 

           Systolic blood pressure 104 mm[Hg]                       104 mm[Hg] M

EDENT ([facility]